# Patient Record
Sex: MALE | Race: WHITE | NOT HISPANIC OR LATINO
[De-identification: names, ages, dates, MRNs, and addresses within clinical notes are randomized per-mention and may not be internally consistent; named-entity substitution may affect disease eponyms.]

---

## 2021-05-28 ENCOUNTER — APPOINTMENT (OUTPATIENT)
Dept: FAMILY MEDICINE | Facility: CLINIC | Age: 51
End: 2021-05-28
Payer: COMMERCIAL

## 2021-05-28 ENCOUNTER — NON-APPOINTMENT (OUTPATIENT)
Age: 51
End: 2021-05-28

## 2021-05-28 VITALS
HEART RATE: 62 BPM | DIASTOLIC BLOOD PRESSURE: 74 MMHG | WEIGHT: 248 LBS | OXYGEN SATURATION: 98 % | BODY MASS INDEX: 32.17 KG/M2 | SYSTOLIC BLOOD PRESSURE: 112 MMHG | TEMPERATURE: 96.3 F | HEIGHT: 73.5 IN

## 2021-05-28 DIAGNOSIS — B35.1 TINEA UNGUIUM: ICD-10-CM

## 2021-05-28 DIAGNOSIS — Z83.49 FAMILY HISTORY OF OTHER ENDOCRINE, NUTRITIONAL AND METABOLIC DISEASES: ICD-10-CM

## 2021-05-28 DIAGNOSIS — Z87.2 PERSONAL HISTORY OF DISEASES OF THE SKIN AND SUBCUTANEOUS TISSUE: ICD-10-CM

## 2021-05-28 DIAGNOSIS — H92.09 OTALGIA, UNSPECIFIED EAR: ICD-10-CM

## 2021-05-28 DIAGNOSIS — R22.9 LOCALIZED SWELLING, MASS AND LUMP, UNSPECIFIED: ICD-10-CM

## 2021-05-28 DIAGNOSIS — Z78.9 OTHER SPECIFIED HEALTH STATUS: ICD-10-CM

## 2021-05-28 DIAGNOSIS — Z86.39 PERSONAL HISTORY OF OTHER ENDOCRINE, NUTRITIONAL AND METABOLIC DISEASE: ICD-10-CM

## 2021-05-28 DIAGNOSIS — R74.8 ABNORMAL LEVELS OF OTHER SERUM ENZYMES: ICD-10-CM

## 2021-05-28 DIAGNOSIS — Z82.3 FAMILY HISTORY OF STROKE: ICD-10-CM

## 2021-05-28 PROCEDURE — 99213 OFFICE O/P EST LOW 20 MIN: CPT

## 2021-05-28 PROCEDURE — 99072 ADDL SUPL MATRL&STAF TM PHE: CPT

## 2021-05-28 RX ORDER — FLUTICASONE PROPIONATE 50 UG/1
50 SPRAY, METERED NASAL
Qty: 16 | Refills: 6 | Status: ACTIVE | COMMUNITY
Start: 2021-05-28 | End: 1900-01-01

## 2021-05-28 NOTE — HISTORY OF PRESENT ILLNESS
[FreeTextEntry8] : Pt has had left ear congestion x 10 days , he has felt diminished hearing as well . He tried debrox for possible cerumen impaction , however this did not resolve his symptoms. He states the symptoms started several days after receiving the covid vaccine and he is concerned that it may be a side effect of the vaccine.

## 2021-05-28 NOTE — PLAN
[FreeTextEntry1] :  Discussed that he has cerumen impaction on right  and that debrox cleansing is recommended. I have also advised decongestants , flonase and saline for possible ET dysfunction, \par ENT Referral list provided for further evaluation of hearing loss and pts symptoms

## 2021-05-28 NOTE — PHYSICAL EXAM
[Normal] : no posterior cervical lymphadenopathy and no anterior cervical lymphadenopathy [de-identified] : Right cerumen impaction , left TM  effusion , Nasal Turbinates edematous

## 2023-05-25 ENCOUNTER — APPOINTMENT (OUTPATIENT)
Dept: FAMILY MEDICINE | Facility: CLINIC | Age: 53
End: 2023-05-25
Payer: COMMERCIAL

## 2023-05-25 VITALS
WEIGHT: 248 LBS | DIASTOLIC BLOOD PRESSURE: 68 MMHG | TEMPERATURE: 97 F | BODY MASS INDEX: 32.17 KG/M2 | HEIGHT: 73.5 IN | SYSTOLIC BLOOD PRESSURE: 138 MMHG | OXYGEN SATURATION: 98 % | HEART RATE: 82 BPM

## 2023-05-25 DIAGNOSIS — K64.4 RESIDUAL HEMORRHOIDAL SKIN TAGS: ICD-10-CM

## 2023-05-25 PROCEDURE — 99213 OFFICE O/P EST LOW 20 MIN: CPT

## 2023-05-25 NOTE — HEALTH RISK ASSESSMENT
[0] : 2) Feeling down, depressed, or hopeless: Not at all (0) [PHQ-2 Negative - No further assessment needed] : PHQ-2 Negative - No further assessment needed [Never] : Never [[id number]] : 0

## 2023-05-25 NOTE — PHYSICAL EXAM
[Soft] : abdomen soft [Non Tender] : non-tender [No Focal Deficits] : no focal deficits [Normal] : affect was normal and insight and judgment were intact [FreeTextEntry1] : external hemorrhoid at 9 o'clock, not thrombosed, no bleeding

## 2023-05-25 NOTE — ASSESSMENT
[FreeTextEntry1] : He has what appears to be a non-thrombosed external hemorrhoid. This is not painful and it sounds like it is decreasing in size since he first noticed it. He has had hemorrhoids before and has never had a colonoscopy.

## 2023-05-25 NOTE — PLAN
[FreeTextEntry1] : Will refer to colorectal surgery for evaluation of hemorrhoids as well as a colonoscopy.

## 2023-05-25 NOTE — HISTORY OF PRESENT ILLNESS
[FreeTextEntry8] : Patient presents with a bump on his anus for about 2 weeks. He noticed this in the shower. It is not very painful and there is no bleeding. He assumed it was a hemorrhoid since he has had similar symptoms in the past after straining for a bowel movement. He feels that it has gotten smaller but has not resolved.

## 2023-06-09 ENCOUNTER — APPOINTMENT (OUTPATIENT)
Dept: COLORECTAL SURGERY | Facility: CLINIC | Age: 53
End: 2023-06-09

## 2023-06-21 ENCOUNTER — APPOINTMENT (OUTPATIENT)
Dept: FAMILY MEDICINE | Facility: CLINIC | Age: 53
End: 2023-06-21
Payer: COMMERCIAL

## 2023-06-21 PROCEDURE — 36415 COLL VENOUS BLD VENIPUNCTURE: CPT

## 2023-06-27 ENCOUNTER — APPOINTMENT (OUTPATIENT)
Dept: FAMILY MEDICINE | Facility: CLINIC | Age: 53
End: 2023-06-27
Payer: COMMERCIAL

## 2023-06-27 VITALS
WEIGHT: 247 LBS | DIASTOLIC BLOOD PRESSURE: 72 MMHG | BODY MASS INDEX: 32.04 KG/M2 | HEART RATE: 64 BPM | SYSTOLIC BLOOD PRESSURE: 114 MMHG | TEMPERATURE: 97.2 F | HEIGHT: 73.5 IN | OXYGEN SATURATION: 96 %

## 2023-06-27 DIAGNOSIS — R53.83 OTHER FATIGUE: ICD-10-CM

## 2023-06-27 DIAGNOSIS — Z00.00 ENCOUNTER FOR GENERAL ADULT MEDICAL EXAMINATION W/OUT ABNORMAL FINDINGS: ICD-10-CM

## 2023-06-27 DIAGNOSIS — Z12.5 ENCOUNTER FOR SCREENING FOR MALIGNANT NEOPLASM OF PROSTATE: ICD-10-CM

## 2023-06-27 DIAGNOSIS — E78.00 PURE HYPERCHOLESTEROLEMIA, UNSPECIFIED: ICD-10-CM

## 2023-06-27 DIAGNOSIS — R74.8 ABNORMAL LEVELS OF OTHER SERUM ENZYMES: ICD-10-CM

## 2023-06-27 LAB
ALBUMIN SERPL ELPH-MCNC: 4.3 G/DL
ALP BLD-CCNC: 243 U/L
ALT SERPL-CCNC: 25 U/L
ANION GAP SERPL CALC-SCNC: 10 MMOL/L
APPEARANCE: CLEAR
AST SERPL-CCNC: 23 U/L
BILIRUB SERPL-MCNC: 0.6 MG/DL
BILIRUBIN URINE: NEGATIVE
BLOOD URINE: NEGATIVE
BUN SERPL-MCNC: 15 MG/DL
CALCIUM SERPL-MCNC: 9.8 MG/DL
CHLORIDE SERPL-SCNC: 102 MMOL/L
CHOLEST SERPL-MCNC: 234 MG/DL
CO2 SERPL-SCNC: 27 MMOL/L
COLOR: YELLOW
CREAT SERPL-MCNC: 1.18 MG/DL
EGFR: 75 ML/MIN/1.73M2
GLUCOSE QUALITATIVE U: NEGATIVE MG/DL
GLUCOSE SERPL-MCNC: 102 MG/DL
HDLC SERPL-MCNC: 45 MG/DL
KETONES URINE: NEGATIVE MG/DL
LDLC SERPL CALC-MCNC: 157 MG/DL
LEUKOCYTE ESTERASE URINE: NEGATIVE
NITRITE URINE: NEGATIVE
NONHDLC SERPL-MCNC: 189 MG/DL
PH URINE: 7
POTASSIUM SERPL-SCNC: 4.5 MMOL/L
PROT SERPL-MCNC: 7.2 G/DL
PROTEIN URINE: NEGATIVE MG/DL
PSA SERPL-MCNC: 0.81 NG/ML
SODIUM SERPL-SCNC: 139 MMOL/L
SPECIFIC GRAVITY URINE: 1.01
TRIGL SERPL-MCNC: 163 MG/DL
UROBILINOGEN URINE: 0.2 MG/DL

## 2023-06-27 PROCEDURE — 99396 PREV VISIT EST AGE 40-64: CPT

## 2023-06-27 NOTE — HEALTH RISK ASSESSMENT
[Yes] : Yes [Monthly or less (1 pt)] : Monthly or less (1 point) [1 or 2 (0 pts)] : 1 or 2 (0 points) [Never (0 pts)] : Never (0 points) [No] : In the past 12 months have you used drugs other than those required for medical reasons? No [0] : 1) Little interest or pleasure doing things: Not at all (0) [1] : 2) Feeling down, depressed, or hopeless for several days (1) [PHQ-2 Negative - No further assessment needed] : PHQ-2 Negative - No further assessment needed [Never] : Never [] :  [# Of Children ___] : has [unfilled] children [Fully functional (bathing, dressing, toileting, transferring, walking, feeding)] : Fully functional (bathing, dressing, toileting, transferring, walking, feeding) [Fully functional (using the telephone, shopping, preparing meals, housekeeping, doing laundry, using] : Fully functional and needs no help or supervision to perform IADLs (using the telephone, shopping, preparing meals, housekeeping, doing laundry, using transportation, managing medications and managing finances) [Excellent] : ~his/her~  mood as  excellent [With Family] : lives with family [Employed] : employed [de-identified] : [employer] [Audit-CScore] : 1 [de-identified] : None [de-identified] : Poor [[id number]] : 1 [Reports changes in vision] : Reports no changes in vision [Reports changes in dental health] : Reports no changes in dental health [ColonoscopyDate] : never [ColonoscopyComments] : Never [de-identified] : UTD dentist, vision, derm

## 2023-06-27 NOTE — HISTORY OF PRESENT ILLNESS
[FreeTextEntry1] : CPE [de-identified] : 53 year M with hypercholesterolemia and obesity presents for an annual physical exam. \par \par He has been feeling fatigued following high sugar foods for the past couple of years. For the last 6 months his fatigue has gotten to the point where he feels the need to take a nap after eating. He also drinks a lot of coffee, \par  He sleeps 6-8 hours a night. His wife states that he snores but he is unsure if this is true. He adds that he has young kids and that he has been drinking more coffee to keep his energy levels up. \par \par He has lumps on his left chest wallt; he has consulted with a dermatologist and was told that they were of no concern. \par \par He has a fungal infection on his right toe and has been applying topical medication for this.\par \par He has hemorrhoids and is planning to consult with a GI surgeon to get this remedied as well as to get his colonoscopy done.

## 2023-06-27 NOTE — ASSESSMENT
[FreeTextEntry1] : I Danielle Krishnamurthy am scribing for the presence of Dr. Beltrán the following sections HISTORY OF PRESENT ILLNESS, PAST MEDICAL/FAMILY/SOCIAL HISTORY; REVIEW OF SYSTEMS; VITAL SIGNS; PHYSICAL EXAM. \par \par  I personally performed the services described in the documentation, reviewed the documentation recorded by the scribe in my presence and it accurately and completely records my words and actions.\par

## 2024-03-21 ENCOUNTER — NON-APPOINTMENT (OUTPATIENT)
Age: 54
End: 2024-03-21